# Patient Record
Sex: FEMALE | Race: WHITE | Employment: FULL TIME | URBAN - METROPOLITAN AREA
[De-identification: names, ages, dates, MRNs, and addresses within clinical notes are randomized per-mention and may not be internally consistent; named-entity substitution may affect disease eponyms.]

---

## 2022-03-23 ENCOUNTER — HOSPITAL ENCOUNTER (EMERGENCY)
Facility: CLINIC | Age: 27
Discharge: HOME | End: 2022-03-23
Attending: EMERGENCY MEDICINE | Admitting: EMERGENCY MEDICINE
Payer: OTHER MISCELLANEOUS

## 2022-03-23 VITALS
TEMPERATURE: 99 F | RESPIRATION RATE: 16 BRPM | OXYGEN SATURATION: 100 % | HEIGHT: 63 IN | WEIGHT: 120 LBS | BODY MASS INDEX: 21.26 KG/M2 | SYSTOLIC BLOOD PRESSURE: 121 MMHG | HEART RATE: 67 BPM | DIASTOLIC BLOOD PRESSURE: 50 MMHG
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
## 2022-03-23 LAB
HBV CORE AB SERPL QL IA: NONREACTIVE
HBV SURFACE AB SERPL IA-ACNC: >1000 M[IU]/ML
HBV SURFACE AG SERPL QL IA: NONREACTIVE
HCG SERPL QL: NEGATIVE
HCV AB SERPL QL IA: NONREACTIVE
HIV 1+2 AB+HIV1 P24 AG SERPL QL IA: NONREACTIVE
HOLD SPECIMEN: NORMAL

## 2022-03-23 PROCEDURE — 86706 HEP B SURFACE ANTIBODY: CPT | Performed by: EMERGENCY MEDICINE

## 2022-03-23 PROCEDURE — 86803 HEPATITIS C AB TEST: CPT | Performed by: EMERGENCY MEDICINE

## 2022-03-23 PROCEDURE — 84703 CHORIONIC GONADOTROPIN ASSAY: CPT | Performed by: EMERGENCY MEDICINE

## 2022-03-23 PROCEDURE — 99284 EMERGENCY DEPT VISIT MOD MDM: CPT

## 2022-03-23 PROCEDURE — 36415 COLL VENOUS BLD VENIPUNCTURE: CPT | Performed by: EMERGENCY MEDICINE

## 2022-03-23 PROCEDURE — 87522 HEPATITIS C REVRS TRNSCRPJ: CPT | Performed by: EMERGENCY MEDICINE

## 2022-03-23 PROCEDURE — 87340 HEPATITIS B SURFACE AG IA: CPT | Performed by: EMERGENCY MEDICINE

## 2022-03-23 PROCEDURE — 87389 HIV-1 AG W/HIV-1&-2 AB AG IA: CPT | Performed by: EMERGENCY MEDICINE

## 2022-03-23 PROCEDURE — 86704 HEP B CORE ANTIBODY TOTAL: CPT | Performed by: EMERGENCY MEDICINE

## 2022-03-23 RX ORDER — EMTRICITABINE AND TENOFOVIR DISOPROXIL FUMARATE 200; 300 MG/1; MG/1
1 TABLET, FILM COATED ORAL DAILY
Qty: 28 TABLET | Refills: 0 | Status: SHIPPED | OUTPATIENT
Start: 2022-03-23 | End: 2022-04-20

## 2022-03-23 RX ORDER — CITALOPRAM HYDROBROMIDE 20 MG/1
20 TABLET ORAL DAILY
COMMUNITY

## 2022-03-23 RX ORDER — ALPRAZOLAM 1 MG
1 TABLET ORAL 3 TIMES DAILY PRN
COMMUNITY

## 2022-03-23 NOTE — ED PROVIDER NOTES
"  History     Chief Complaint:  Body Fluid Exposure       HPI   Britney Mccollum is a 26 year old female who presents with needlestick injury to the left dorsal distal ring finger.  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  She went to cannulate the patient's fistula when she was stuck with a bloody needle.  She immediately washed the area.  She denies known history of HIV or hepatitis.  She states the patient's blood was drawn to test for HIV, HCV, HBV.  Per review of the [facility], last tetanus shot was 2020.    ROS:  Review of Systems  A 10 point ROS was obtained and negative except as noted here and in HPI      Allergies:  No Known Allergies     Medications:    ALPRAZolam (XANAX) 1 MG tablet  busPIRone HCl (BUSPAR PO)  citalopram (CELEXA) 20 MG tablet  dolutegravir (TIVICAY) 50 MG tablet  emtricitabine-tenofovir (TRUVADA) 200-300 MG per tablet        Past Medical History:    Past Medical History:   Diagnosis Date     Anxiety      Depressive disorder      There is no problem list on file for this patient.       Past Surgical History:    History reviewed. No pertinent surgical history.     Family History:    family history is not on file.    Social History:   reports that she has been smoking vaping device. She has never used smokeless tobacco. She reports current alcohol use. She reports that she does not use drugs.  PCP: No primary care provider on file.     Physical Exam     Patient Vitals for the past 24 hrs:   BP Temp Temp src Pulse Resp SpO2 Height Weight   03/23/22 0912 121/50 99  F (37.2  C) Temporal 67 16 100 % 1.6 m (5' 3\") 54.4 kg (120 lb)        Physical Exam  VS: Reviewed per above  HENT: normal speech  EYES: sclera anicteric  CV: Rate as noted  RESP: Effort normal.   NEURO: Alert, moving all extremities  MSK: No deformity of the extremities  SKIN: Warm and dry. Pinpoint puncture wound to the dorsal distal left ring finger.    Emergency Department Course "       Laboratory:  Labs Ordered and Resulted from Time of ED Arrival to Time of ED Departure   HCG QUALITATIVE PREGNANCY - Normal       Result Value    hCG Serum Qualitative Negative     HEPATITIS C RNA, QUANTITATIVE BY PCR   HEPATITIS B CORE ANTIBODY   HEPATITIS B SURFACE ANTIGEN   HEPATITIS C ANTIBODY   HIV ANTIGEN ANTIBODY COMBO   HEPATITIS B SURFACE ANTIBODY        Emergency Department Course:             Reviewed:  I reviewed nursing notes, vitals and [facility]    Assessments:   I obtained history and examined the patient as noted above.    I rechecked the patient and explained findings.     Interventions:  Medications   dolutegravir  (TIVICAY) tablet 50 mg - HIV PEP Kit Component (50 mg Oral Given 3/23/22 1151)   emtricitabine-tenofovir (TRUVADA) - HIV PEP Kit Component (1 tablet Oral Given 3/23/22 1152)        Disposition:  The patient was discharged to home.     Impression & Plan      Medical Decision Making:  Patient presents to the ER for evaluation of needlestick injury to her left distal ring finger.  On arrival vital signs are reassuring.  Patient had already washed the area.  Exposure labs were drawn.  Pregnancy testing was negative.  HIV postexposure prophylaxis was provided, including 3-day supply here in the ER.  Discussed the importance of not getting pregnant while taking this medication.  Encouraged primary care follow-up for ongoing evaluation.    Diagnosis:    ICD-10-CM    1. Accidental needlestick injury with exposure to body fluid  W46.1XXA CANCELED: Hepatitis C antibody - Baseline     CANCELED: Hepatitis C RNA quantitative - Baseline     CANCELED: HIV Antigen Antibody Combo - Baseline     CANCELED: Hepatitis B core antibody - Baseline     CANCELED: Hepatitis B Surface Antibody - Baseline     CANCELED: Hepatitis B surface antigen - Baseline     CANCELED: Hepatitis C antibody - Baseline     CANCELED: Hepatitis C RNA quantitative - Baseline     CANCELED: HIV Antigen Antibody Combo - Baseline      CANCELED: Hepatitis B core antibody - Baseline     CANCELED: Hepatitis B Surface Antibody - Baseline     CANCELED: Hepatitis B surface antigen - Baseline        Discharge Medications:  Discharge Medication List as of 3/23/2022 11:48 AM      START taking these medications    Details   dolutegravir (TIVICAY) 50 MG tablet Take 1 tablet (50 mg) by mouth daily for 28 days, Disp-28 tablet, R-0, Local Print      emtricitabine-tenofovir (TRUVADA) 200-300 MG per tablet Take 1 tablet by mouth daily for 28 days, Disp-28 tablet, R-0, Local Print              3/23/2022   Graham Richardson MD Lindenbaum, Elan, MD  03/23/22 1114

## 2022-03-23 NOTE — DISCHARGE INSTRUCTIONS
Please follow-up with the dialysis center to determine if you need to continue HIV prophylaxis, pending HIV test results from source patient.  In the meantime please take a 3-day supply of HIV prophylaxis medication as prescribed.  If you do end up filling the prescription for HIV prophylaxis for the entire month, be aware that not all pharmacies carry this medication.  Please call ahead of time.  The pharmacy at [facility] should carry this.  It is important that you take this medication every day as prescribed without missing any days.  This medication should not be taken while pregnant or trying to get pregnant.  Your pregnancy test was negative today.  It is important that you abstain from sexual activity for the entire duration while you are taking this medication.

## 2022-03-23 NOTE — ED TRIAGE NOTES
Patient was stuck by a dirty needle while at work. Was pierced on left ring finger, states was bleeding after injury. Scrubbed puncture wound after incident. Last tetanus was possibly last year.     Needs labs drawn.     Patient states source does not have hepatitis. States labs drawn at the dialysis center.

## 2022-03-24 LAB — HCV RNA SERPL NAA+PROBE-ACNC: NOT DETECTED IU/ML

## 2022-03-25 ENCOUNTER — TELEPHONE (OUTPATIENT)
Dept: EMERGENCY MEDICINE | Facility: CLINIC | Age: 27
End: 2022-03-25

## 2022-03-25 NOTE — TELEPHONE ENCOUNTER
[facility] Emergency Department Lab result notification:    Reason for call  Blood and body fluid exposure  Lab Result  The following blood and bodily fluid lab results from a recent exposure were all negative (nonreactive) for:     Hepatitis C antibody     Hepatitis C RNA quantitative    Hepatitis B surface antigen (agn)    Hepatitis B Core antibody    HIV Antigen Antibody Combo.     The following blood and body fluid lab result from a recent exposure were Positive (reactive) for:                -Hepatitis B surface antibody  [Note: If Hepatitis B surface ANTIBODY (scott) is reactive/positive, this indicates Patient has immunity]  Recommendations in Treatment per [facility] ED lab result Blood exposure protocol    ED visit Date: 3/23/22  Symptoms reported at ED visit Medical Decision Making:  Patient presents to the ER for evaluation of needlestick injury to her left distal ring finger.  On arrival vital signs are reassuring.  Patient had already washed the area.  Exposure labs were drawn.  Pregnancy testing was negative.  HIV postexposure prophylaxis was provided, including 3-day supply here in the ER.  Discussed the importance of not getting pregnant while taking this medication.  Encouraged primary care follow-up for ongoing evaluation.   Miscellaneous information Graham Richardson MD  03/23/22 1528     Current symptoms  12:24 pm Left voicemail message requesting a call back to [facility] ED Lab Result RN at [phone].  RN is available every day between 9 a.m. and 5:30 p.m.    RN Assessment (Patient s current Symptoms), include time called.  [Insert Left message here if message left]  Pt returned my call and doing fine.     RN Recommendations/Instructions per [facility] ED lab result protocol  Patient notified of lab result and treatment recommendations. 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 If nothing with contact her PCP and set up 6 wk test  repeats. She will continue her post exposure medication from the ED as prescribed. Rational given and discussed. Questions answered. Patient voices understanding and is in agreement with this plan.     Please Contact your PCP clinic or return to the Emergency department if your:    Symptoms return.    Symptoms worsen or other concerning symptom's.    Sandra Lundberg RN  [facility]  Emergency Dept Lab Result RN  Ph# [phone]           Sandra Lundberg RN  [facility]  Emergency Dept Lab Result RN  Ph# [phone]